# Patient Record
(demographics unavailable — no encounter records)

---

## 2025-02-10 NOTE — HISTORY OF PRESENT ILLNESS
[FreeTextEntry1] : Patient is 42 years old para 0-0-1-0 last menstrual period February 6, 2025 Patient is status post IVF which resulted in miscarriage with Dr. Stoney Maier. She is presently a patient of Dr. Christiano Cano/Dr. Stafford/Dr. Alan She is status post laparoscopy for endometriosis and adenomyoma Patient states that she was trying on her own and had all utilize until January 2025 and will start IVF cycles in March 2025 Mammogram on May 2, 2024 revealed a asymmetry in the right breast that has the appearance of a benign lymph node measuring 0.7 cm at 10:00 location 12 cm from the nipple Pap was performed in December 2024 and patient states was noted to be within normal limit

## 2025-02-10 NOTE — PHYSICAL EXAM
[Chaperone Present] : A chaperone was present in the examining room during all aspects of the physical examination [Appropriately responsive] : appropriately responsive [Alert] : alert [No Acute Distress] : no acute distress [No Lymphadenopathy] : no lymphadenopathy [Regular Rate Rhythm] : regular rate rhythm [No Murmurs] : no murmurs [Clear to Auscultation B/L] : clear to auscultation bilaterally [Soft] : soft [Non-tender] : non-tender [Non-distended] : non-distended [No HSM] : No HSM [No Lesions] : no lesions [No Mass] : no mass [Oriented x3] : oriented x3 [Examination Of The Breasts] : a normal appearance [No Masses] : no breast masses were palpable [Labia Majora] : normal [Labia Minora] : normal [Normal] : normal [Enlarged ___ wks] : enlarged [unfilled] ~Uweeks [Anteversion] : anteverted [Uterine Adnexae] : normal [FreeTextEntry2] : Zeenat

## 2025-02-10 NOTE — DISCUSSION/SUMMARY
[FreeTextEntry1] : Pap performed December 2024 Self breast exam stressed Prescribed yearly bilateral screening mammogram Follow-up with reproductive endocrinologists as scheduled Keep menstrual calendar Follow-up yearly or as needed